# Patient Record
Sex: MALE | Race: WHITE | NOT HISPANIC OR LATINO | Employment: OTHER | URBAN - METROPOLITAN AREA
[De-identification: names, ages, dates, MRNs, and addresses within clinical notes are randomized per-mention and may not be internally consistent; named-entity substitution may affect disease eponyms.]

---

## 2017-01-01 ENCOUNTER — APPOINTMENT (OUTPATIENT)
Dept: RADIOLOGY | Facility: MEDICAL CENTER | Age: 72
DRG: 854 | End: 2017-01-01
Attending: EMERGENCY MEDICINE
Payer: MEDICARE

## 2017-01-01 PROBLEM — A04.72 C. DIFFICILE COLITIS: Status: ACTIVE | Noted: 2017-01-01

## 2017-01-01 PROBLEM — K81.9 CHOLECYSTITIS: Status: ACTIVE | Noted: 2017-01-01

## 2017-01-01 PROBLEM — I10 HTN (HYPERTENSION): Status: ACTIVE | Noted: 2017-01-01

## 2017-01-01 PROBLEM — R79.89 ELEVATED LFTS: Status: ACTIVE | Noted: 2017-01-01

## 2017-01-01 PROCEDURE — 74181 MRI ABDOMEN W/O CONTRAST: CPT

## 2017-01-02 ENCOUNTER — APPOINTMENT (OUTPATIENT)
Dept: RADIOLOGY | Facility: MEDICAL CENTER | Age: 72
DRG: 854 | End: 2017-01-02
Attending: SURGERY
Payer: MEDICARE

## 2017-01-02 PROBLEM — I48.0 PAROXYSMAL A-FIB (HCC): Status: ACTIVE | Noted: 2017-01-02

## 2017-01-02 PROCEDURE — 74300 X-RAY BILE DUCTS/PANCREAS: CPT

## 2017-01-03 ENCOUNTER — APPOINTMENT (OUTPATIENT)
Dept: RADIOLOGY | Facility: MEDICAL CENTER | Age: 72
DRG: 854 | End: 2017-01-03
Attending: INTERNAL MEDICINE
Payer: MEDICARE

## 2017-01-04 ENCOUNTER — PATIENT OUTREACH (OUTPATIENT)
Dept: HEALTH INFORMATION MANAGEMENT | Facility: OTHER | Age: 72
End: 2017-01-04

## 2017-01-04 PROBLEM — A04.72 C. DIFFICILE COLITIS: Status: RESOLVED | Noted: 2017-01-01 | Resolved: 2017-01-04

## 2017-01-04 PROBLEM — K81.9 CHOLECYSTITIS: Status: RESOLVED | Noted: 2017-01-01 | Resolved: 2017-01-04

## 2017-01-05 ENCOUNTER — PATIENT OUTREACH (OUTPATIENT)
Dept: HEALTH INFORMATION MANAGEMENT | Facility: OTHER | Age: 72
End: 2017-01-05

## 2017-01-09 ENCOUNTER — HOSPITAL ENCOUNTER (OUTPATIENT)
Dept: LAB | Facility: MEDICAL CENTER | Age: 72
End: 2017-01-09
Attending: INTERNAL MEDICINE
Payer: MEDICARE

## 2017-01-09 LAB
ALBUMIN SERPL BCP-MCNC: 4 G/DL (ref 3.2–4.9)
ALBUMIN/GLOB SERPL: 1.5 G/DL
ALP SERPL-CCNC: 118 U/L (ref 30–99)
ALT SERPL-CCNC: 114 U/L (ref 2–50)
ANION GAP SERPL CALC-SCNC: 7 MMOL/L (ref 0–11.9)
AST SERPL-CCNC: 43 U/L (ref 12–45)
BILIRUB SERPL-MCNC: 1.5 MG/DL (ref 0.1–1.5)
BUN SERPL-MCNC: 11 MG/DL (ref 8–22)
CALCIUM SERPL-MCNC: 9.2 MG/DL (ref 8.5–10.5)
CHLORIDE SERPL-SCNC: 107 MMOL/L (ref 96–112)
CO2 SERPL-SCNC: 23 MMOL/L (ref 20–33)
CREAT SERPL-MCNC: 0.79 MG/DL (ref 0.5–1.4)
GLOBULIN SER CALC-MCNC: 2.7 G/DL (ref 1.9–3.5)
GLUCOSE SERPL-MCNC: 92 MG/DL (ref 65–99)
POTASSIUM SERPL-SCNC: 3.8 MMOL/L (ref 3.6–5.5)
PROT SERPL-MCNC: 6.7 G/DL (ref 6–8.2)
SODIUM SERPL-SCNC: 137 MMOL/L (ref 135–145)

## 2017-01-09 PROCEDURE — 36415 COLL VENOUS BLD VENIPUNCTURE: CPT

## 2017-01-09 PROCEDURE — 80053 COMPREHEN METABOLIC PANEL: CPT

## 2017-01-18 ENCOUNTER — HOSPITAL ENCOUNTER (OUTPATIENT)
Dept: LAB | Facility: MEDICAL CENTER | Age: 72
End: 2017-01-18
Attending: INTERNAL MEDICINE
Payer: MEDICARE

## 2017-01-18 LAB
ALBUMIN SERPL BCP-MCNC: 4.4 G/DL (ref 3.2–4.9)
ALBUMIN/GLOB SERPL: 1.6 G/DL
ALP SERPL-CCNC: 92 U/L (ref 30–99)
ALT SERPL-CCNC: 61 U/L (ref 2–50)
ANION GAP SERPL CALC-SCNC: 5 MMOL/L (ref 0–11.9)
AST SERPL-CCNC: 36 U/L (ref 12–45)
BILIRUB SERPL-MCNC: 1.6 MG/DL (ref 0.1–1.5)
BUN SERPL-MCNC: 14 MG/DL (ref 8–22)
CALCIUM SERPL-MCNC: 9.7 MG/DL (ref 8.5–10.5)
CHLORIDE SERPL-SCNC: 103 MMOL/L (ref 96–112)
CO2 SERPL-SCNC: 26 MMOL/L (ref 20–33)
CREAT SERPL-MCNC: 0.92 MG/DL (ref 0.5–1.4)
GLOBULIN SER CALC-MCNC: 2.8 G/DL (ref 1.9–3.5)
GLUCOSE SERPL-MCNC: 104 MG/DL (ref 65–99)
POTASSIUM SERPL-SCNC: 4.2 MMOL/L (ref 3.6–5.5)
PROT SERPL-MCNC: 7.2 G/DL (ref 6–8.2)
SODIUM SERPL-SCNC: 134 MMOL/L (ref 135–145)

## 2017-01-18 PROCEDURE — 80053 COMPREHEN METABOLIC PANEL: CPT

## 2017-01-18 PROCEDURE — 36415 COLL VENOUS BLD VENIPUNCTURE: CPT

## 2017-08-02 ENCOUNTER — HOSPITAL ENCOUNTER (OUTPATIENT)
Dept: LAB | Facility: MEDICAL CENTER | Age: 72
End: 2017-08-02
Attending: UROLOGY
Payer: MEDICARE

## 2017-08-02 PROCEDURE — 80053 COMPREHEN METABOLIC PANEL: CPT

## 2017-08-02 PROCEDURE — 84153 ASSAY OF PSA TOTAL: CPT

## 2017-08-02 PROCEDURE — 85025 COMPLETE CBC W/AUTO DIFF WBC: CPT

## 2017-08-03 LAB
ALBUMIN SERPL BCP-MCNC: 4.6 G/DL (ref 3.2–4.9)
ALBUMIN/GLOB SERPL: 1.8 G/DL
ALP SERPL-CCNC: 81 U/L (ref 30–99)
ALT SERPL-CCNC: 26 U/L (ref 2–50)
ANION GAP SERPL CALC-SCNC: 9 MMOL/L (ref 0–11.9)
AST SERPL-CCNC: 20 U/L (ref 12–45)
BASOPHILS # BLD AUTO: 0.6 % (ref 0–1.8)
BASOPHILS # BLD: 0.04 K/UL (ref 0–0.12)
BILIRUB SERPL-MCNC: 1.4 MG/DL (ref 0.1–1.5)
BUN SERPL-MCNC: 18 MG/DL (ref 8–22)
CALCIUM SERPL-MCNC: 10 MG/DL (ref 8.5–10.5)
CHLORIDE SERPL-SCNC: 106 MMOL/L (ref 96–112)
CO2 SERPL-SCNC: 23 MMOL/L (ref 20–33)
CREAT SERPL-MCNC: 0.98 MG/DL (ref 0.5–1.4)
EOSINOPHIL # BLD AUTO: 0 K/UL (ref 0–0.51)
EOSINOPHIL NFR BLD: 0 % (ref 0–6.9)
ERYTHROCYTE [DISTWIDTH] IN BLOOD BY AUTOMATED COUNT: 47.9 FL (ref 35.9–50)
GFR SERPL CREATININE-BSD FRML MDRD: >60 ML/MIN/1.73 M 2
GLOBULIN SER CALC-MCNC: 2.6 G/DL (ref 1.9–3.5)
GLUCOSE SERPL-MCNC: 98 MG/DL (ref 65–99)
HCT VFR BLD AUTO: 51.5 % (ref 42–52)
HGB BLD-MCNC: 17 G/DL (ref 14–18)
IMM GRANULOCYTES # BLD AUTO: 0.03 K/UL (ref 0–0.11)
IMM GRANULOCYTES NFR BLD AUTO: 0.5 % (ref 0–0.9)
LYMPHOCYTES # BLD AUTO: 1.74 K/UL (ref 1–4.8)
LYMPHOCYTES NFR BLD: 27.6 % (ref 22–41)
MCH RBC QN AUTO: 30.5 PG (ref 27–33)
MCHC RBC AUTO-ENTMCNC: 33 G/DL (ref 33.7–35.3)
MCV RBC AUTO: 92.3 FL (ref 81.4–97.8)
MONOCYTES # BLD AUTO: 0.44 K/UL (ref 0–0.85)
MONOCYTES NFR BLD AUTO: 7 % (ref 0–13.4)
NEUTROPHILS # BLD AUTO: 4.05 K/UL (ref 1.82–7.42)
NEUTROPHILS NFR BLD: 64.3 % (ref 44–72)
NRBC # BLD AUTO: 0 K/UL
NRBC BLD AUTO-RTO: 0 /100 WBC
PLATELET # BLD AUTO: 156 K/UL (ref 164–446)
PMV BLD AUTO: 12.3 FL (ref 9–12.9)
POTASSIUM SERPL-SCNC: 4.5 MMOL/L (ref 3.6–5.5)
PROT SERPL-MCNC: 7.2 G/DL (ref 6–8.2)
PSA SERPL-MCNC: 4.06 NG/ML (ref 0–4)
RBC # BLD AUTO: 5.58 M/UL (ref 4.7–6.1)
SODIUM SERPL-SCNC: 138 MMOL/L (ref 135–145)
WBC # BLD AUTO: 6.3 K/UL (ref 4.8–10.8)

## 2017-08-23 ENCOUNTER — OFFICE VISIT (OUTPATIENT)
Dept: MEDICAL GROUP | Facility: PHYSICIAN GROUP | Age: 72
End: 2017-08-23
Payer: MEDICARE

## 2017-08-23 VITALS
DIASTOLIC BLOOD PRESSURE: 82 MMHG | WEIGHT: 276 LBS | HEART RATE: 74 BPM | SYSTOLIC BLOOD PRESSURE: 126 MMHG | TEMPERATURE: 97.9 F | OXYGEN SATURATION: 95 % | HEIGHT: 72 IN | BODY MASS INDEX: 37.38 KG/M2 | RESPIRATION RATE: 16 BRPM

## 2017-08-23 DIAGNOSIS — I10 ESSENTIAL HYPERTENSION: ICD-10-CM

## 2017-08-23 DIAGNOSIS — Z90.5 ACQUIRED SOLITARY KIDNEY: ICD-10-CM

## 2017-08-23 DIAGNOSIS — K21.9 GASTROESOPHAGEAL REFLUX DISEASE WITHOUT ESOPHAGITIS: ICD-10-CM

## 2017-08-23 DIAGNOSIS — Z13.6 SCREENING FOR CARDIOVASCULAR CONDITION: ICD-10-CM

## 2017-08-23 DIAGNOSIS — F40.240 CLAUSTROPHOBIA: ICD-10-CM

## 2017-08-23 DIAGNOSIS — R97.21 RISING PSA FOLLOWING TREATMENT FOR MALIGNANT NEOPLASM OF PROSTATE: ICD-10-CM

## 2017-08-23 DIAGNOSIS — E66.9 OBESITY (BMI 35.0-39.9 WITHOUT COMORBIDITY): ICD-10-CM

## 2017-08-23 PROBLEM — R79.89 ELEVATED LFTS: Status: RESOLVED | Noted: 2017-01-01 | Resolved: 2017-08-23

## 2017-08-23 LAB — EKG 4674: NORMAL

## 2017-08-23 PROCEDURE — 99204 OFFICE O/P NEW MOD 45 MIN: CPT | Performed by: FAMILY MEDICINE

## 2017-08-23 ASSESSMENT — PATIENT HEALTH QUESTIONNAIRE - PHQ9: CLINICAL INTERPRETATION OF PHQ2 SCORE: 0

## 2017-08-23 NOTE — MR AVS SNAPSHOT
"        Nahid Sosa   2017 7:00 AM   Office Visit   MRN: [id number]    Department:  [facility]   Dept Phone:  [phone]    Description:  Male : 1945   Provider:  Emi Gonzalez M.D.           Reason for Visit     Establish Care           Allergies as of 2017     No Known Allergies      You were diagnosed with     Rising PSA following treatment for malignant neoplasm of prostate   [0459833]       Essential hypertension   [7437836]       Gastroesophageal reflux disease without esophagitis   [845707]       Acquired solitary kidney   [911291]       Claustrophobia   [618145]       Obesity (BMI 35.0-39.9 without comorbidity) (HCC)   [286592]       Screening for cardiovascular condition   [002982]         Vital Signs     Blood Pressure Pulse Temperature Respirations Height Weight    126/82 mmHg 74 36.6 °C (97.9 °F) 16 1.835 m (6' 0.24\") 125.193 kg (276 lb)    Body Mass Index Oxygen Saturation Smoking Status             37.18 kg/m2 95% Never Smoker          Basic Information     Date Of Birth Sex Race Ethnicity Preferred Language    1945 Male White Non- English      Your appointments     Sep 22, 2017 12:45 PM   MR RECTAL/PROSTATE PROTOCOL with [address] [facility] 1   [facility] - MRI - [address]    [address]  [place] [id number] [phone]           Some exams require specific prep instructions that would have been given to you at time of scheduling. If you have any additional questions about the prep instructions, please call Imaging Scheduling at [phone] and press #[phone].              Problem List              ICD-10-CM Priority Class Noted - Resolved    HTN (hypertension) I10   2017 - Present    Perioperative Afib  I48.0   2017 - Present    Gastroesophageal reflux disease without esophagitis K21.9   2017 - Present    Rising PSA following treatment for malignant neoplasm of prostate R97.21   2017 - Present    Post-operative Solitary " kidney s/p Left Nephrectomy for Renal Cell Cancer Z90.5   8/23/2017 - Present    Claustrophobia F40.240   8/23/2017 - Present    Obesity (BMI 35.0-39.9 without comorbidity) ([facility]) E66.9   8/23/2017 - Present      Health Maintenance        Date Due Completion Dates    IMM DTaP/Tdap/Td Vaccine (1 - Tdap) 2/23/1965 ---    COLONOSCOPY 2/23/1996 ---    IMM ZOSTER VACCINE 2/23/2006 ---    IMM PNEUMOCOCCAL 65+ (ADULT) LOW/MEDIUM RISK SERIES (1 of 2 - PCV13) 2/23/2011 ---    IMM INFLUENZA (1) 9/1/2017 11/4/2016            Current Immunizations     Influenza TIV (IM) 11/4/2016    Pneumococcal Vaccine (PCV7) Historical Data 11/4/2012      Below and/or attached are the medications your provider expects you to take. Review all of your home medications and newly ordered medications with your provider and/or pharmacist. Follow medication instructions as directed by your provider and/or pharmacist. Please keep your medication list with you and share with your provider. Update the information when medications are discontinued, doses are changed, or new medications (including over-the-counter products) are added; and carry medication information at all times in the event of emergency situations     Allergies:  No Known Allergies          Medications  Valid as of: August 23, 2017 -  7:49 AM    Generic Name Brand Name Tablet Size Instructions for use    Aspirin (Tab)  MG Take 1 Tab by mouth every day.        Lisinopril (Tab) PRINIVIL 10 MG Take 10 mg by mouth every day.        .                 Medicines prescribed today were sent to:     [facility] [id number] [place] [address] [place] [address] [facility] [place] [place] [address]    Phone: [phone] Fax: [phone]    Open 24 Hours?: No      Medication refill instructions:       If your prescription bottle indicates you have medication refills left, it is not necessary to call your provider’s office. Please contact your pharmacy and  they will refill your medication.    If your prescription bottle indicates you do not have any refills left, you may request refills at any time through one of the following ways: The online [facility] system (except Urgent Care), by calling your provider’s office, or by asking your pharmacy to contact your provider’s office with a refill request. Medication refills are processed only during regular business hours and may not be available until the next business day. Your provider may request additional information or to have a follow-up visit with you prior to refilling your medication.   *Please Note: Medication refills are assigned a new Rx number when refilled electronically. Your pharmacy may indicate that no refills were authorized even though a new prescription for the same medication is available at the pharmacy. Please request the medicine by name with the pharmacy before contacting your provider for a refill.        Your To Do List     Future Labs/Procedures Complete By Expires    LIPID PROFILE  As directed 8/23/2018         [facility] Access Code: Activation code not generated  Current [facility] Status: Active

## 2017-09-22 ENCOUNTER — HOSPITAL ENCOUNTER (OUTPATIENT)
Dept: RADIOLOGY | Facility: MEDICAL CENTER | Age: 72
End: 2017-09-22
Attending: UROLOGY
Payer: MEDICARE

## 2017-09-22 VITALS
HEIGHT: 73 IN | TEMPERATURE: 98.6 F | RESPIRATION RATE: 15 BRPM | HEART RATE: 80 BPM | BODY MASS INDEX: 35.12 KG/M2 | SYSTOLIC BLOOD PRESSURE: 101 MMHG | OXYGEN SATURATION: 95 % | WEIGHT: 265 LBS | DIASTOLIC BLOOD PRESSURE: 54 MMHG

## 2017-09-22 DIAGNOSIS — K43.2 INCISIONAL HERNIA WITHOUT MENTION OF OBSTRUCTION OR GANGRENE: ICD-10-CM

## 2017-09-22 PROCEDURE — 72197 MRI PELVIS W/O & W/DYE: CPT

## 2017-09-22 PROCEDURE — 700111 HCHG RX REV CODE 636 W/ 250 OVERRIDE (IP)

## 2017-09-22 PROCEDURE — 700111 HCHG RX REV CODE 636 W/ 250 OVERRIDE (IP): Performed by: RADIOLOGY

## 2017-09-22 PROCEDURE — 700117 HCHG RX CONTRAST REV CODE 255: Performed by: UROLOGY

## 2017-09-22 PROCEDURE — A9577 INJ MULTIHANCE: HCPCS | Performed by: UROLOGY

## 2017-09-22 RX ADMIN — GADOBENATE DIMEGLUMINE 10 ML: 529 INJECTION, SOLUTION INTRAVENOUS at 12:29

## 2017-09-22 RX ADMIN — GLUCAGON HYDROCHLORIDE 1 MG: KIT at 12:35

## 2017-09-22 ASSESSMENT — PAIN SCALES - GENERAL
PAINLEVEL_OUTOF10: 0

## 2017-09-22 NOTE — DISCHARGE INSTRUCTIONS
MRI ADULT DISCHARGE INSTRUCTIONS    You have been medicated today for your scan. Please follow the instructions below to ensure your safe recovery. If you have any questions or problems, feel free to call us at [phone] or [phone].     1.   Have someone stay with you to assist you as needed.    2.   Do not drive or operate any mechanical devices.    3.   Do not perform any activity that requires concentration. Make no major decisions over the next 24 hours.     4.   Be careful changing positions from laying down to sitting or standing, as you may become dizzy.     5.   Do not drink alcohol for 48 hours.    6.   There are no restrictions for eating your normal meals. Drink fluids.    7.   You may continue your usual medications for pain, tranquilizers, muscle relaxants or sedatives when awake.     8.   Tomorrow, you may continue your normal daily activities.     9.   Pressure dressing on 10 - 15 minutes. If swelling or bleeding occurs when removed, continue placing direct pressure on injection site for another 5 minutes, or until bleeding stops.     I have been informed of and understand the above discharge instructions. Midazolam (VERSED)  What is this medicine?  You were given MIDAZOLAM (RICK fletcher) for your procedure today. This medication is a benzodiazepine. It is used to cause relaxation or sleep before surgery and to block the memory of the procedure.  This medicine may be used for other purposes; ask your health care provider or pharmacist if you have questions.  What side effects may I notice from receiving this medicine?  Side effects that you should report to your doctor or health care professional as soon as possible:  • allergic reactions like skin rash, itching or hives, swelling of the face, lips, or tongue  • breathing problems  • confusion  • dizziness or lightheadedness  • fast, irregular heartbeat  • halluninations during recovery  • numbness or tingling in the hands or feet  • pain, redness,  or swelling at site where injected  • seizures  Side effects that usually do not require medical attention (report to your doctor or health care professional if they continue or are bothersome):  • coughing  • headache  • hiccups  • involuntary eye and muscle movements  • loss of memory of events just before, during, and after use  • nausea, vomiting  • speech problems  • tiredness  • trouble sleeping or nightmares  This list may not describe all possible side effects. Call your doctor for medical advice about side effects. You may report side effects to FDA at 6-139-RAL-2114.    Fentanyl  What is this medicine?  You were given FENTANYL (FEN ta nil) for your procedure today, it is a pain reliever. It is used to treat breakthrough pain that your long acting pain medicine does not control. Do not use this medicine for a pain that will go away in a few days like pain from surgery, doctor or dentist visits.   This medicine may be used for other purposes; ask your health care provider or pharmacist if you have questions.  What side effects may I notice from receiving this medicine?  Side effects that you should report to your doctor or health care professional as soon as possible:  • allergic reactions like skin rash, itching or hives, swelling of the face, lips, or tongue  • breathing problems  • changes in vision  • confusion  • dry mouth  • feeling faint, lightheaded  • hallucination  • irregular heartbeat  • mouth pain, sores  • problems with balance, talking, walking  • trouble passing urine or change in the amount of urine  • unusual bleeding or bruising  • unusually weak or tired  Side effects that usually do not require medical attention (report to your doctor or health care professional if they continue or are bothersome):  • dizzy  • headache  • loss of appetite  • nausea, vomiting  • sweating  • tingling in mouth  This list may not describe all possible side effects. Call your doctor for medical advice about  side effects. You may report side effects to FDA at 5-553-FDA-5880.

## 2018-06-11 ENCOUNTER — APPOINTMENT (OUTPATIENT)
Dept: RADIOLOGY | Facility: MEDICAL CENTER | Age: 73
End: 2018-06-11
Attending: EMERGENCY MEDICINE
Payer: MEDICARE

## 2018-06-11 ENCOUNTER — HOSPITAL ENCOUNTER (OUTPATIENT)
Facility: MEDICAL CENTER | Age: 73
End: 2018-06-12
Attending: EMERGENCY MEDICINE | Admitting: HOSPITALIST
Payer: MEDICARE

## 2018-06-11 DIAGNOSIS — R53.83 OTHER FATIGUE: ICD-10-CM

## 2018-06-11 DIAGNOSIS — R06.09 DOE (DYSPNEA ON EXERTION): ICD-10-CM

## 2018-06-11 PROBLEM — I48.0 PAROXYSMAL ATRIAL FIBRILLATION (HCC): Status: ACTIVE | Noted: 2018-06-11

## 2018-06-11 LAB
ALBUMIN SERPL BCP-MCNC: 4.4 G/DL (ref 3.2–4.9)
ALBUMIN/GLOB SERPL: 1.6 G/DL
ALP SERPL-CCNC: 71 U/L (ref 30–99)
ALT SERPL-CCNC: 17 U/L (ref 2–50)
ANION GAP SERPL CALC-SCNC: 9 MMOL/L (ref 0–11.9)
APTT PPP: 34.9 SEC (ref 24.7–36)
AST SERPL-CCNC: 15 U/L (ref 12–45)
BASOPHILS # BLD AUTO: 0.5 % (ref 0–1.8)
BASOPHILS # BLD: 0.03 K/UL (ref 0–0.12)
BILIRUB SERPL-MCNC: 1.1 MG/DL (ref 0.1–1.5)
BNP SERPL-MCNC: 8 PG/ML (ref 0–100)
BUN SERPL-MCNC: 21 MG/DL (ref 8–22)
CALCIUM SERPL-MCNC: 9.3 MG/DL (ref 8.5–10.5)
CHLORIDE SERPL-SCNC: 110 MMOL/L (ref 96–112)
CO2 SERPL-SCNC: 20 MMOL/L (ref 20–33)
CREAT SERPL-MCNC: 0.91 MG/DL (ref 0.5–1.4)
DEPRECATED D DIMER PPP IA-ACNC: <200 NG/ML(D-DU)
EKG IMPRESSION: NORMAL
EKG IMPRESSION: NORMAL
EOSINOPHIL # BLD AUTO: 0.01 K/UL (ref 0–0.51)
EOSINOPHIL NFR BLD: 0.2 % (ref 0–6.9)
ERYTHROCYTE [DISTWIDTH] IN BLOOD BY AUTOMATED COUNT: 46.1 FL (ref 35.9–50)
GLOBULIN SER CALC-MCNC: 2.7 G/DL (ref 1.9–3.5)
GLUCOSE SERPL-MCNC: 104 MG/DL (ref 65–99)
HCT VFR BLD AUTO: 47.8 % (ref 42–52)
HGB BLD-MCNC: 16.2 G/DL (ref 14–18)
IMM GRANULOCYTES # BLD AUTO: 0.03 K/UL (ref 0–0.11)
IMM GRANULOCYTES NFR BLD AUTO: 0.5 % (ref 0–0.9)
INR PPP: 0.96 (ref 0.87–1.13)
LIPASE SERPL-CCNC: 47 U/L (ref 11–82)
LV EJECT FRACT  99904: 70
LV EJECT FRACT MOD 2C 99903: 89.79
LV EJECT FRACT MOD 4C 99902: 79.34
LV EJECT FRACT MOD BP 99901: 85.74
LYMPHOCYTES # BLD AUTO: 1.44 K/UL (ref 1–4.8)
LYMPHOCYTES NFR BLD: 23 % (ref 22–41)
MAGNESIUM SERPL-MCNC: 2.2 MG/DL (ref 1.5–2.5)
MCH RBC QN AUTO: 30.3 PG (ref 27–33)
MCHC RBC AUTO-ENTMCNC: 33.9 G/DL (ref 33.7–35.3)
MCV RBC AUTO: 89.3 FL (ref 81.4–97.8)
MONOCYTES # BLD AUTO: 0.41 K/UL (ref 0–0.85)
MONOCYTES NFR BLD AUTO: 6.5 % (ref 0–13.4)
NEUTROPHILS # BLD AUTO: 4.34 K/UL (ref 1.82–7.42)
NEUTROPHILS NFR BLD: 69.3 % (ref 44–72)
NRBC # BLD AUTO: 0 K/UL
NRBC BLD-RTO: 0 /100 WBC
PHOSPHATE SERPL-MCNC: 2.7 MG/DL (ref 2.5–4.5)
PLATELET # BLD AUTO: 150 K/UL (ref 164–446)
PMV BLD AUTO: 10.6 FL (ref 9–12.9)
POTASSIUM SERPL-SCNC: 4.4 MMOL/L (ref 3.6–5.5)
PROT SERPL-MCNC: 7.1 G/DL (ref 6–8.2)
PROTHROMBIN TIME: 12.5 SEC (ref 12–14.6)
RBC # BLD AUTO: 5.35 M/UL (ref 4.7–6.1)
SODIUM SERPL-SCNC: 139 MMOL/L (ref 135–145)
T4 FREE SERPL-MCNC: 0.87 NG/DL (ref 0.53–1.43)
TROPONIN I SERPL-MCNC: <0.01 NG/ML (ref 0–0.04)
TSH SERPL DL<=0.005 MIU/L-ACNC: 1.25 UIU/ML (ref 0.38–5.33)
WBC # BLD AUTO: 6.3 K/UL (ref 4.8–10.8)

## 2018-06-11 PROCEDURE — 84484 ASSAY OF TROPONIN QUANT: CPT

## 2018-06-11 PROCEDURE — 93005 ELECTROCARDIOGRAM TRACING: CPT

## 2018-06-11 PROCEDURE — 84100 ASSAY OF PHOSPHORUS: CPT

## 2018-06-11 PROCEDURE — 83735 ASSAY OF MAGNESIUM: CPT

## 2018-06-11 PROCEDURE — 93306 TTE W/DOPPLER COMPLETE: CPT

## 2018-06-11 PROCEDURE — 85379 FIBRIN DEGRADATION QUANT: CPT

## 2018-06-11 PROCEDURE — 85610 PROTHROMBIN TIME: CPT

## 2018-06-11 PROCEDURE — 94760 N-INVAS EAR/PLS OXIMETRY 1: CPT

## 2018-06-11 PROCEDURE — 99285 EMERGENCY DEPT VISIT HI MDM: CPT

## 2018-06-11 PROCEDURE — 99220 PR INITIAL OBSERVATION CARE,LEVL III: CPT | Performed by: HOSPITALIST

## 2018-06-11 PROCEDURE — 85730 THROMBOPLASTIN TIME PARTIAL: CPT

## 2018-06-11 PROCEDURE — 93010 ELECTROCARDIOGRAM REPORT: CPT | Performed by: INTERNAL MEDICINE

## 2018-06-11 PROCEDURE — 700102 HCHG RX REV CODE 250 W/ 637 OVERRIDE(OP): Performed by: EMERGENCY MEDICINE

## 2018-06-11 PROCEDURE — 84443 ASSAY THYROID STIM HORMONE: CPT

## 2018-06-11 PROCEDURE — 93005 ELECTROCARDIOGRAM TRACING: CPT | Performed by: HOSPITALIST

## 2018-06-11 PROCEDURE — 700102 HCHG RX REV CODE 250 W/ 637 OVERRIDE(OP): Performed by: HOSPITALIST

## 2018-06-11 PROCEDURE — A9270 NON-COVERED ITEM OR SERVICE: HCPCS | Performed by: EMERGENCY MEDICINE

## 2018-06-11 PROCEDURE — 80053 COMPREHEN METABOLIC PANEL: CPT

## 2018-06-11 PROCEDURE — 85025 COMPLETE CBC W/AUTO DIFF WBC: CPT

## 2018-06-11 PROCEDURE — 83880 ASSAY OF NATRIURETIC PEPTIDE: CPT

## 2018-06-11 PROCEDURE — 84439 ASSAY OF FREE THYROXINE: CPT

## 2018-06-11 PROCEDURE — 83690 ASSAY OF LIPASE: CPT

## 2018-06-11 PROCEDURE — G0378 HOSPITAL OBSERVATION PER HR: HCPCS

## 2018-06-11 PROCEDURE — 71045 X-RAY EXAM CHEST 1 VIEW: CPT

## 2018-06-11 PROCEDURE — A9270 NON-COVERED ITEM OR SERVICE: HCPCS | Performed by: HOSPITALIST

## 2018-06-11 PROCEDURE — 93005 ELECTROCARDIOGRAM TRACING: CPT | Performed by: EMERGENCY MEDICINE

## 2018-06-11 PROCEDURE — 36415 COLL VENOUS BLD VENIPUNCTURE: CPT

## 2018-06-11 PROCEDURE — 93306 TTE W/DOPPLER COMPLETE: CPT | Mod: 26 | Performed by: INTERNAL MEDICINE

## 2018-06-11 RX ORDER — BISACODYL 10 MG
10 SUPPOSITORY, RECTAL RECTAL
Status: DISCONTINUED | OUTPATIENT
Start: 2018-06-11 | End: 2018-06-12 | Stop reason: HOSPADM

## 2018-06-11 RX ORDER — AMOXICILLIN 250 MG
2 CAPSULE ORAL 2 TIMES DAILY
Status: DISCONTINUED | OUTPATIENT
Start: 2018-06-11 | End: 2018-06-12 | Stop reason: HOSPADM

## 2018-06-11 RX ORDER — LISINOPRIL 10 MG/1
10 TABLET ORAL DAILY
Status: DISCONTINUED | OUTPATIENT
Start: 2018-06-11 | End: 2018-06-12 | Stop reason: HOSPADM

## 2018-06-11 RX ORDER — ACETAMINOPHEN 325 MG/1
650 TABLET ORAL EVERY 6 HOURS PRN
Status: DISCONTINUED | OUTPATIENT
Start: 2018-06-11 | End: 2018-06-12 | Stop reason: HOSPADM

## 2018-06-11 RX ORDER — ASPIRIN 325 MG
325 TABLET ORAL ONCE
Status: COMPLETED | OUTPATIENT
Start: 2018-06-11 | End: 2018-06-11

## 2018-06-11 RX ORDER — POLYETHYLENE GLYCOL 3350 17 G/17G
1 POWDER, FOR SOLUTION ORAL
Status: DISCONTINUED | OUTPATIENT
Start: 2018-06-11 | End: 2018-06-12 | Stop reason: HOSPADM

## 2018-06-11 RX ADMIN — LISINOPRIL 10 MG: 10 TABLET ORAL at 21:33

## 2018-06-11 RX ADMIN — ASPIRIN 325 MG: 325 TABLET ORAL at 11:06

## 2018-06-11 RX ADMIN — DOCUSATE SODIUM AND SENNOSIDES 2 TABLET: 8.6; 5 TABLET, FILM COATED ORAL at 21:37

## 2018-06-11 ASSESSMENT — LIFESTYLE VARIABLES
ALCOHOL_USE: NO
EVER_SMOKED: NEVER

## 2018-06-11 ASSESSMENT — PAIN SCALES - GENERAL
PAINLEVEL_OUTOF10: 0

## 2018-06-11 ASSESSMENT — ENCOUNTER SYMPTOMS
ORTHOPNEA: 0
PALPITATIONS: 1
SHORTNESS OF BREATH: 1
FEVER: 0
PND: 0

## 2018-06-11 ASSESSMENT — PATIENT HEALTH QUESTIONNAIRE - PHQ9
1. LITTLE INTEREST OR PLEASURE IN DOING THINGS: NOT AT ALL
SUM OF ALL RESPONSES TO PHQ9 QUESTIONS 1 AND 2: 0
SUM OF ALL RESPONSES TO PHQ9 QUESTIONS 1 AND 2: 0
2. FEELING DOWN, DEPRESSED, IRRITABLE, OR HOPELESS: NOT AT ALL
2. FEELING DOWN, DEPRESSED, IRRITABLE, OR HOPELESS: NOT AT ALL
1. LITTLE INTEREST OR PLEASURE IN DOING THINGS: NOT AT ALL

## 2018-06-11 NOTE — ED PROVIDER NOTES
ED Provider Note    Scribed for Mer Csat M.D. by Kanchan Victoria. 6/11/2018  9:34 AM    Primary care provider: Emi Gonzalez M.D.  Means of arrival: Walk in  History obtained from: Patient  History limited by: None    CHIEF COMPLAINT  •  Dizziness  •  Shortness of Breath  •  Palpitations    HPI  Nahid Sosa is a 73 y.o. male who presents to the Emergency Department for dizziness, shortness of breath and palpitations with an onset of 2 days. History of new onset of atrial fibrillation one year ago. Patient reports a history of hypertension. Patient has experienced intermittent dizziness for the past two months. He returned to [place] from [place] four weeks ago and states he has been under significant stress. His symptoms worsened two days ago while vacuuming and began experiencing dizziness, shortness of breath and palpitations.  He is short of breath primarily with activity which resolves at rest. He complains of palpitations described as pounding in his chest. He has experienced similar episodes over the past two days. He did not take Aspirin today. Negative for fevers, chills, chest pain, leg swelling, cough, abdominal pain, nausea, vomiting, diarrhea, headache, loss of consciousness, focal weakness or numbness. He reports taking Lisinopril over the past few weeks but has not taken the medication for the past nine months. No prior cardiac history. Positive family history of cardiac disease.      REVIEW OF SYSTEMS  HEENT:  No ear pain, congestion, or sore throat   EYES: No vision changes  CARDIAC: Positive for palpitations. No chest pain or leg swelling.  PULMONARY: Positive for shortness of breath. No cough.  GI: No abdominal pain, nausea, vomiting or diarrhea.  : no dysuria, back pain, or hematuria   Neuro: Positive for dizziness. No headache, loss of consciousness, focal weakness or numbness.  Musculoskeletal: No leg swelling.  Endocrine: No fevers or chills.  SKIN: No rash.  Psychiatric:  Positive increased stress.    See history of present illness. All other systems are negative. C.      PAST MEDICAL HISTORY  Patient has a past medical history of Atrial fibrillation one year ago; Diverticulosis; Hypertension; Incisional hernia; Prostate cancer (CMS-HCC); and Renal cell cancer (CMS-HCC). No prior cardiac history.       SURGICAL HISTORY  Patient has a past surgical history that includes samir by laparoscopy (N/A, 2017); ercp in or (1/3/2017); and other.      SOCIAL HISTORY  Social History   Substance Use Topics   • Smoking status: Never Smoker   • Smokeless tobacco: Never Used   • Alcohol use 0.0 oz/week      Comment: occasional       History   Drug Use No   Patient is accompanied by his spouse.      FAMILY HISTORY  Family History   Problem Relation Age of Onset   • Heart Disease Father      possibly heart disease   • Other Mother       of Polio early age   Positive family history of cardiac disease.      CURRENT MEDICATIONS  Home Medications     Reviewed by Chaya Solis (Pharmacy Tech) on 18 at 1110  Med List Status: Complete   Medication Last Dose Status   aspirin EC (ECOTRIN) 81 MG Tablet Delayed Response 6/10/2018 Active   lisinopril (PRINIVIL) 10 MG Tab 2018 Active   multivitamin (THERAGRAN) Tab 2018 Active            Patient denies taking Aspirin today.      ALLERGIES  None      PHYSICAL EXAM  VITAL SIGNS: /86   Pulse 69   Temp 37 °C (98.6 °F)   Resp 16   Wt 122.6 kg (270 lb 4.5 oz)   SpO2 94%   BMI 35.66 kg/m²       Constitutional: Well developed, Well nourished, No acute distress, Non-toxic appearance.   HEENT: Normocephalic, Atraumatic,  external ears normal, pharynx pink,  Mucous  Membranes moist, No rhinorrhea or mucosal edema  Eyes: PERRL, EOMI, Conjunctiva normal, No discharge.   Neck: Normal range of motion, No tenderness, Supple, No stridor.   Lymphatic: No lymphadenopathy    Cardiovascular: Regular Rate and Rhythm, No murmurs,  rubs, or  gallops.   Thorax & Lungs: Lungs clear to auscultation bilaterally, No respiratory distress, No wheezes, rhales or rhonchi, No chest wall tenderness.   Abdomen: Bowel sounds normal, Soft, non tender, non distended,  No pulsatile masses., no rebound guarding or peritoneal signs.   Skin: Warm, Dry, No erythema, No rash,   Back:  No CVA tenderness,  No spinal tenderness, bony crepitance, step offs, or instability.   Neurologic: Alert & oriented x 3, Normal motor function, Normal sensory function, No focal deficits noted. Normal reflexes. Normal Cranial Nerves.  Extremities: Equal, intact distal pulses, No cyanosis, clubbing or edema,  No tenderness.  No venous cording.  Musculoskeletal: Good range of motion in all major joints. No tenderness to palpation or major deformities noted.       DIAGNOSTIC STUDIES / PROCEDURES    LABS  Results for orders placed or performed during the hospital encounter of 06/11/18   CBC with Differential   Result Value Ref Range    WBC 6.3 4.8 - 10.8 K/uL    RBC 5.35 4.70 - 6.10 M/uL    Hemoglobin 16.2 14.0 - 18.0 g/dL    Hematocrit 47.8 42.0 - 52.0 %    MCV 89.3 81.4 - 97.8 fL    MCH 30.3 27.0 - 33.0 pg    MCHC 33.9 33.7 - 35.3 g/dL    RDW 46.1 35.9 - 50.0 fL    Platelet Count 150 (L) 164 - 446 K/uL    MPV 10.6 9.0 - 12.9 fL    Neutrophils-Polys 69.30 44.00 - 72.00 %    Lymphocytes 23.00 22.00 - 41.00 %    Monocytes 6.50 0.00 - 13.40 %    Eosinophils 0.20 0.00 - 6.90 %    Basophils 0.50 0.00 - 1.80 %    Immature Granulocytes 0.50 0.00 - 0.90 %    Nucleated RBC 0.00 /100 WBC    Neutrophils (Absolute) 4.34 1.82 - 7.42 K/uL    Lymphs (Absolute) 1.44 1.00 - 4.80 K/uL    Monos (Absolute) 0.41 0.00 - 0.85 K/uL    Eos (Absolute) 0.01 0.00 - 0.51 K/uL    Baso (Absolute) 0.03 0.00 - 0.12 K/uL    Immature Granulocytes (abs) 0.03 0.00 - 0.11 K/uL    NRBC (Absolute) 0.00 K/uL   Complete Metabolic Panel (CMP)   Result Value Ref Range    Sodium 139 135 - 145 mmol/L    Potassium 4.4 3.6 - 5.5 mmol/L     Chloride 110 96 - 112 mmol/L    Co2 20 20 - 33 mmol/L    Anion Gap 9.0 0.0 - 11.9    Glucose 104 (H) 65 - 99 mg/dL    Bun 21 8 - 22 mg/dL    Creatinine 0.91 0.50 - 1.40 mg/dL    Calcium 9.3 8.5 - 10.5 mg/dL    AST(SGOT) 15 12 - 45 U/L    ALT(SGPT) 17 2 - 50 U/L    Alkaline Phosphatase 71 30 - 99 U/L    Total Bilirubin 1.1 0.1 - 1.5 mg/dL    Albumin 4.4 3.2 - 4.9 g/dL    Total Protein 7.1 6.0 - 8.2 g/dL    Globulin 2.7 1.9 - 3.5 g/dL    A-G Ratio 1.6 g/dL   Btype Natriuretic Peptide (BNP)   Result Value Ref Range    B Natriuretic Peptide 8 0 - 100 pg/mL   Prothrombin Time (PT/INR)   Result Value Ref Range    PT 12.5 12.0 - 14.6 sec    INR 0.96 0.87 - 1.13   APTT   Result Value Ref Range    APTT 34.9 24.7 - 36.0 sec   Lipase   Result Value Ref Range    Lipase 47 11 - 82 U/L   Troponin STAT   Result Value Ref Range    Troponin I <0.01 0.00 - 0.04 ng/mL   Magnesium   Result Value Ref Range    Magnesium 2.2 1.5 - 2.5 mg/dL   Phosphorus   Result Value Ref Range    Phosphorus 2.7 2.5 - 4.5 mg/dL   D-Dimer   Result Value Ref Range    D-Dimer Screen <200 <250 ng/mL(D-DU)   TSH (for screening thyroid dysfunction)   Result Value Ref Range    TSH 1.250 0.380 - 5.330 uIU/mL   Free Thyroxine (add to TSH for patients with existing thyroid dysfunction)   Result Value Ref Range    Free T-4 0.87 0.53 - 1.43 ng/dL   ESTIMATED GFR   Result Value Ref Range    GFR If African American >60 >60 mL/min/1.73 m 2    GFR If Non African American >60 >60 mL/min/1.73 m 2   EKG (ER)   Result Value Ref Range    Report       [facility] Emergency Dept.    Test Date:  2018  Pt Name:    TEENA AUSTIN             Department: ER  MRN:        [id number]                      Room:  Gender:     Male                         Technician: [id number]  :        1945                   Requested By:ER TRIAGE PROTOCOL  Order #:    [id number]                    Allie GARCIA:    Measurements  Intervals                                 Axis  Rate:       65                           P:          -11  LA:         188                          QRS:        95  QRSD:       84                           T:          61  QT:         404  QTc:        421    Interpretive Statements  SINUS RHYTHM  RIGHT AXIS DEVIATION  BORDERLINE LOW VOLTAGE IN FRONTAL LEADS  CONSIDER ANTEROSEPTAL INFARCT  Compared to ECG 01/01/2017 18:54:09  Right-axis deviation now present  Atrial fibrillation no longer present  Myocardial infarct finding still present        All labs reviewed by me.      EKG  12 lead EKG; Interpreted by emergency department physician    Rhythm: Normal Sinus Rhythm   Rate: 65  Axis: Right axis deviation  R Wave: Normal R wave progression  Normal ST-T segments  ST Segments: No acute ST changes  T waves: Normal  Q waves: None    Clinical Impression: No acute changes; improved from prior EKG which indicated atrial fibrillation with RVR      RADIOLOGY  DX-CHEST-PORTABLE (1 VIEW)   Final Result      No acute cardiac or pulmonary abnormalities are identified.      NM-CARDIAC STRESS TEST    (Results Pending)   ECHOCARDIOGRAM COMP W/O CONT    (Results Pending)     The radiologist's interpretation of all radiological studies have been reviewed by me.      COURSE & MEDICAL DECISION MAKING  Pertinent Labs & Imaging studies reviewed. (See chart for details)    Differential Diagnoses include but are not limited to: acute coronary syndrome, cardiac ischemia, MI, CHF, less likely COPD.    9:34 Obtained and reviewed patient's electronic medical record which indicates a cholecystectomy in 01/2017.    9:38 AM Patient seen and examined at bedside. Patient presents for dizziness, palpitations and shortness of breath.  Exam indicates a regular rate and rhythm.      Initial orders in the Emergency Department included EKG, XR chest and laboratory testing: D dimer, TSH, free thyroxine, CBC with differential, CMP, estimated GFR, BNP, prothrombin time, APTT, lipase, troponin,  magnesium and phosphorus.      10:40 AM Ordered 325 mg of Aspirin PO.    11:01 AM Spoke with Dr. Jacome, Hospitalist, regarding the patient's presenting symptoms and prior history of atrial fibrillation. He will consult and admit the patient for further evaluation and care.    11:04 AM On repeat evaluation, lab and XR results were reviewed with the patient. Plan for admission with Dr. Jacome. Patient verbalized his agreement to this plan.          DISPOSITION  Patient will be admitted to Dr. Jacome, Hospitalist, in stable condition.      DIAGNOSIS  1. ROBB (dyspnea on exertion)    2. Other fatigue           The note accurately reflects work and decisions made by me.  Mer Cast  6/11/2018  1:49 PM     Kanchan COOPER (Scribe), am scribing for, and in the presence of, Mer Cast M.D.    Electronically signed by: Kanchan Victoria (Scribangélica), 6/11/2018    Mer COOPER M.D. personally performed the services described in this documentation, as scribed by Kanchan Victoria in my presence, and it is both accurate and complete.

## 2018-06-11 NOTE — ED TRIAGE NOTES
Pt comes complaining of SOB, dizziness, palpitations for several months but worse over the weekend. Family stating pt is unable to walk any distance without extreme dizziness and palpitations reporting hx of afib approx 1 year ago. EKG called for.

## 2018-06-11 NOTE — PROGRESS NOTES
Report received, assumed patient care.  Pt A&OX4.  Family at bedside.  Assessment completed.  Call light within reach, personal belongings available, bed in lowest position, pt calling for assistance.  Pt reports no chest pain.  Communication board updated, POC discussed.  Monitors applied, VSS.  No additional needs at this time.

## 2018-06-12 ENCOUNTER — PATIENT OUTREACH (OUTPATIENT)
Dept: HEALTH INFORMATION MANAGEMENT | Facility: OTHER | Age: 73
End: 2018-06-12

## 2018-06-12 ENCOUNTER — APPOINTMENT (OUTPATIENT)
Dept: RADIOLOGY | Facility: MEDICAL CENTER | Age: 73
End: 2018-06-12
Attending: HOSPITALIST
Payer: MEDICARE

## 2018-06-12 VITALS
HEART RATE: 67 BPM | TEMPERATURE: 98 F | WEIGHT: 270.28 LBS | DIASTOLIC BLOOD PRESSURE: 67 MMHG | SYSTOLIC BLOOD PRESSURE: 148 MMHG | OXYGEN SATURATION: 93 % | RESPIRATION RATE: 20 BRPM | BODY MASS INDEX: 36.61 KG/M2 | HEIGHT: 72 IN

## 2018-06-12 LAB — EKG IMPRESSION: NORMAL

## 2018-06-12 PROCEDURE — 700111 HCHG RX REV CODE 636 W/ 250 OVERRIDE (IP)

## 2018-06-12 PROCEDURE — A9502 TC99M TETROFOSMIN: HCPCS

## 2018-06-12 PROCEDURE — 99217 PR OBSERVATION CARE DISCHARGE: CPT | Performed by: HOSPITALIST

## 2018-06-12 PROCEDURE — G0378 HOSPITAL OBSERVATION PER HR: HCPCS

## 2018-06-12 PROCEDURE — A9270 NON-COVERED ITEM OR SERVICE: HCPCS | Performed by: NURSE PRACTITIONER

## 2018-06-12 PROCEDURE — 700102 HCHG RX REV CODE 250 W/ 637 OVERRIDE(OP): Performed by: NURSE PRACTITIONER

## 2018-06-12 RX ORDER — CALCIUM CARBONATE 500 MG/1
500 TABLET, CHEWABLE ORAL 3 TIMES DAILY PRN
Status: DISCONTINUED | OUTPATIENT
Start: 2018-06-12 | End: 2018-06-12 | Stop reason: HOSPADM

## 2018-06-12 RX ORDER — REGADENOSON 0.08 MG/ML
INJECTION, SOLUTION INTRAVENOUS
Status: COMPLETED
Start: 2018-06-12 | End: 2018-06-12

## 2018-06-12 RX ORDER — ALPRAZOLAM 0.5 MG/1
0.5 TABLET ORAL ONCE
Status: COMPLETED | OUTPATIENT
Start: 2018-06-12 | End: 2018-06-12

## 2018-06-12 RX ADMIN — ALPRAZOLAM 0.5 MG: 0.5 TABLET ORAL at 08:15

## 2018-06-12 RX ADMIN — REGADENOSON 0.4 MG: 0.08 INJECTION, SOLUTION INTRAVENOUS at 08:59

## 2018-06-12 ASSESSMENT — PAIN SCALES - GENERAL: PAINLEVEL_OUTOF10: 0

## 2018-06-12 NOTE — ASSESSMENT & PLAN NOTE
History of.  If he does not have any arrhythmias on tele, he may be a good candidate for a holter monitor outpatient give his symptoms and hx of PAF.

## 2018-06-12 NOTE — DISCHARGE INSTRUCTIONS
Discharge Instructions    Discharged to home by car with relative. Discharged via wheelchair, hospital escort: Yes.  Special equipment needed: Not Applicable    Be sure to schedule a follow-up appointment with your primary care doctor or any specialists as instructed.     Discharge Plan:   Diet Plan: Discussed (Regular)  Activity Level: Discussed (As tolerated)  Confirmed Follow up Appointment: Patient to Call and Schedule Appointment  Confirmed Symptoms Management: Discussed  Medication Reconciliation Updated: Yes  Influenza Vaccine Indication: Not indicated: Previously immunized this influenza season and > 8 years of age    I understand that a diet low in cholesterol, fat, and sodium is recommended for good health. Unless I have been given specific instructions below for another diet, I accept this instruction as my diet prescription.   Other diet: Regular    Special Instructions: None    · Is patient discharged on Warfarin / Coumadin?   No     Depression / Suicide Risk    As you are discharged from this [facility] facility, it is important to learn how to keep safe from harming yourself.    Recognize the warning signs:  · Abrupt changes in personality, positive or negative- including increase in energy   · Giving away possessions  · Change in eating patterns- significant weight changes-  positive or negative  · Change in sleeping patterns- unable to sleep or sleeping all the time   · Unwillingness or inability to communicate  · Depression  · Unusual sadness, discouragement and loneliness  · Talk of wanting to die  · Neglect of personal appearance   · Rebelliousness- reckless behavior  · Withdrawal from people/activities they love  · Confusion- inability to concentrate     If you or a loved one observes any of these behaviors or has concerns about self-harm, here's what you can do:  · Talk about it- your feelings and reasons for harming yourself  · Remove any means that you might use to hurt yourself (examples:  pills, rope, extension cords, firearm)  · Get professional help from the community (Mental Health, Substance Abuse, psychological counseling)  · Do not be alone:Call your Safe Contact- someone whom you trust who will be there for you.  · Call your local CRISIS HOTLINE 470-9236 or 214-253-9552  · Call your local Children's Mobile Crisis Response Team [place] (707) 948-0959 or www.[employer]  · Call the toll free National Suicide Prevention Hotlines   · National Suicide Prevention Lifeline 346-671-DVAN (2467)  · National Hope Line Network 800-SUICIDE (585-6063)

## 2018-06-12 NOTE — PROGRESS NOTES
Report received, assumed patient care.  Pt A&OX4.  Assessment completed.  Call light within reach, personal belongings available, bed in lowest position, pt calling for assistance.  Pt reports no chest pain.  - dizziness.  Communication board updated, POC discussed.  Monitors reapplied, VSS.  No additional needs at this time.

## 2018-06-12 NOTE — H&P
Hospital Medicine History and Physical    Date of Service  6/11/2018    Chief Complaint  Chief Complaint   Patient presents with   • Palpitations   • Dizziness       History of Presenting Illness  73 y.o. male who presented 6/11/2018 with shortness of breath. Mr. Sosa is a history of ectomy and postoperative atrial fibrillation that for the past month has been expressing progressive shortness of breath with exertion associated dizziness as well.  He states this week while vacuuming he had to stop 2 times and this would be extremely unusual for him to feel so short of breath which with relatively light duty exertion.  Last night he experienced palpitations with shortness of breath even while at rest.  Symptoms of with palpitations shortness of breath again this morning.  Now to the point where he cannot even walk up his driveway without becoming short of breath.  He presented emergency room with these complaints and here he is in a sinus rhythm labs are unrevealing.  He denies chest pain.  Patient will be admitted for further workup on the telemetry floor including echocardiogram and stress test.  Discussed that he may be a good candidate for an outpatient cardiac monitor.  Primary Care Physician  Emi Gonzalez M.D.    Consultants      Code Status  full    Review of Systems  Review of Systems   Constitutional: Negative for fever.        No weight loss nor weight gain   Respiratory: Positive for shortness of breath.    Cardiovascular: Positive for palpitations. Negative for chest pain, orthopnea, leg swelling and PND.   All other systems reviewed and are negative.       Past Medical History  Past Medical History:   Diagnosis Date   • Diverticulosis    • Hypertension    • Incisional hernia    • Prostate cancer (HCC)     s/p MRI guided laser ablation in [place]   • Renal cell cancer (HCC)     left, nephrectomy    post-surgical atrial fibrillation     Surgical History  Past Surgical History:   Procedure Laterality Date    • ERCP IN OR  1/3/2017    Procedure: ERCP IN OR;  Surgeon: Trevon Valle M.D.;  Location: SURGERY [facility];  Service:    • KATHARINA BY LAPAROSCOPY N/A 2017    Procedure: KATHARINA BY LAPAROSCOPY- Grams;  Surgeon: Twyla Jonas M.D.;  Location: SURGERY [facility];  Service:    • OTHER      MRI guided laser ablation of prostate       Medications  No current facility-administered medications on file prior to encounter.      Current Outpatient Prescriptions on File Prior to Encounter   Medication Sig Dispense Refill   • lisinopril (PRINIVIL) 10 MG Tab Take 10 mg by mouth every day.         Family History  Family History   Problem Relation Age of Onset   • Heart Disease Father      possibly heart disease   • Other Mother       of Polio early age   father  of a heart attack in his 80's.    Social History  Social History   Substance Use Topics   • Smoking status: Never Smoker   • Smokeless tobacco: Never Used   • Alcohol use 0.0 oz/week      Comment: occasional    he has increased stress from moving    Allergies  No Known Allergies     Physical Exam  Laboratory   Hemodynamics  Temp (24hrs), Av.8 °C (98.3 °F), Min:36.8 °C (98.2 °F), Max:37 °C (98.6 °F)   Temperature: 36.8 °C (98.2 °F)  Pulse  Av  Min: 60  Max: 75 Heart Rate (Monitored): 64  Blood Pressure : 136/70, NIBP: 126/50      Respiratory      Respiration: 16, Pulse Oximetry: 95 %             Physical Exam   Constitutional: He is oriented to person, place, and time. No distress.   HENT:   Head: Normocephalic and atraumatic.   Neck: Neck supple.   Cardiovascular: Normal rate and regular rhythm.    No murmur heard.  Pulmonary/Chest: Effort normal. No respiratory distress. He has no wheezes. He has no rales.   Abdominal: Soft. Bowel sounds are normal. He exhibits no distension. There is no tenderness.   Musculoskeletal: He exhibits no edema or tenderness.   Neurological: He is alert and oriented to person, place, and time.   Skin: Skin is  warm. He is not diaphoretic.   Psychiatric: He has a normal mood and affect. His behavior is normal.   Nursing note and vitals reviewed.      Recent Labs      06/11/18   0955   WBC  6.3   RBC  5.35   HEMOGLOBIN  16.2   HEMATOCRIT  47.8   MCV  89.3   MCH  30.3   MCHC  33.9   RDW  46.1   PLATELETCT  150*   MPV  10.6     Recent Labs      06/11/18   0955   SODIUM  139   POTASSIUM  4.4   CHLORIDE  110   CO2  20   GLUCOSE  104*   BUN  21   CREATININE  0.91   CALCIUM  9.3     Recent Labs      06/11/18   0955   ALTSGPT  17   ASTSGOT  15   ALKPHOSPHAT  71   TBILIRUBIN  1.1   LIPASE  47   GLUCOSE  104*     Recent Labs      06/11/18   0955   APTT  34.9   INR  0.96     Recent Labs      06/11/18   0955   BNPBTYPENAT  8         Lab Results   Component Value Date    TROPONINI <0.01 06/11/2018     Urinalysis:    Lab Results  Component Value Date/Time   SPECGRAVITY 1.017 12/31/2016 1842   GLUCOSEUR Negative 12/31/2016 1842   KETONES Negative 12/31/2016 1842   NITRITE Negative 12/31/2016 1842        Imaging  ECHOCARDIOGRAM COMP W/O CONT   Final Result      DX-CHEST-PORTABLE (1 VIEW)   Final Result      No acute cardiac or pulmonary abnormalities are identified.      NM-CARDIAC STRESS TEST    (Results Pending)     EKG: sinus rhythm at 65, no dynamic changes.    Assessment/Plan     I anticipate this patient is appropriate for observation status at this time.    * ROBB (dyspnea on exertion)- (present on admission)   Assessment & Plan    This has been rapidly progressive over the past month.   His previous echo had revealed an EF of 70% on 1/17 and will be repeated.  This may be due to tachyarrhythmia thus monitor on tele.  This may be an anginal equivalent thus serial troponins and a stress test has been ordered for the morning.         Paroxysmal atrial fibrillation (HCC)- (present on admission)   Assessment & Plan    History of.  If he does not have any arrhythmias on tele, he may be a good candidate for a holter monitor outpatient  give his symptoms and hx of PAF.        Obesity (BMI 35.0-39.9 without comorbidity)- (present on admission)   Assessment & Plan    BMI is 36.6        HTN (hypertension)- (present on admission)   Assessment & Plan    On lisinopril 10 mg daily            VTE prophylaxis: lovenox.

## 2018-06-12 NOTE — PROGRESS NOTES
Pt discharged to home. IV d/c'd, pt armband removed. Pt and family understand written and verbal d/c instructions.  No new rx's.  Regular diet, activity as tolerated.  Pt to follow up with PCP as scheduled.  Pt verbalized understanding.

## 2018-06-12 NOTE — ASSESSMENT & PLAN NOTE
This has been rapidly progressive over the past month.   His previous echo had revealed an EF of 70% on 1/17 and will be repeated.  This may be due to tachyarrhythmia thus monitor on tele.  This may be an anginal equivalent thus serial troponins and a stress test has been ordered for the morning.

## 2018-06-12 NOTE — DISCHARGE SUMMARY
Discharge Summary    CHIEF COMPLAINT ON ADMISSION  Chief Complaint   Patient presents with   • Palpitations   • Dizziness       Reason for Admission  Dizziness   Palpitations    Admission Date  6/11/2018    CODE STATUS  Full Code    HPI & HOSPITAL COURSE  This is a 73 y.o. male with a past medical history ectomy and postoperative atrial fibrillation here with complaints of intermittent palpitations, dyspnea on exertion, and intermittent dizziness. The patient reports that he has recently returned to [place] from a lower altitude and that his symptoms of dizziness, dyspnea on exertion, and palpitations have started since he has returned to a higher altitude. He does report having a history of atrial fibrillation after surgery but this resolved and the patient takes no medication for this. On admission his EKG was stable in regular sinus rhythm. He maintained sinus rhythm on telemetry over his hospital stay. His echocardiogram revealed an LVEF of 70% with no other acute abnormality. Cardiac stress test was negative for inducible ischemia. The patient has not had any episodes of dizziness or palpitations since admission. There is no indication at this time for acute intervention. He is recommended to have outpatient Holter monitoring to assess for underlying dysrhythmia as there was no indication of this while in the inpatient setting. At this time as the patient is stable he will be discharged home.  He is discharged in good and stable condition to home with close outpatient follow-up.    Discharge Date  06/12/2018    FOLLOW UP ITEMS POST DISCHARGE  1. Holter monitoring to assess for dysrhythmia.    DISCHARGE DIAGNOSES  Principal Problem:    ROBB (dyspnea on exertion) POA: Yes  Active Problems:    Paroxysmal atrial fibrillation (HCC) POA: Yes    HTN (hypertension) POA: Yes    Obesity (BMI 35.0-39.9 without comorbidity) POA: Yes  Resolved Problems:    * No resolved hospital problems. *      FOLLOW UP  Future  Appointments  Date Time Provider Department Center   6/19/2018 8:00 AM RYAN Joyner   7/11/2018 7:00 AM [facility]       MEDICATIONS ON DISCHARGE     Medication List      CONTINUE taking these medications      Instructions   aspirin EC 81 MG Tbec  Commonly known as:  ECOTRIN   Take 81 mg by mouth every 48 hours.  Dose:  81 mg     lisinopril 10 MG Tabs  Commonly known as:  PRINIVIL   Take 10 mg by mouth every day.  Dose:  10 mg     multivitamin Tabs   Take 1 Tab by mouth every day.  Dose:  1 Tab            Allergies  No Known Allergies    DIET  Orders Placed This Encounter   Procedures   • DIET ORDER     Standing Status:   Standing     Number of Occurrences:   1     Order Specific Question:   Diet:     Answer:   Regular [1]     Order Specific Question:   Miscellaneous modifications:     Answer:   No Decaf, No Caffeine(for test) [11]     Comments:   Patient to have no caffeine for 12 hours prior to exam (decaf, coffee, cola, tea, chocolate)       ACTIVITY  As tolerated.      LABORATORY  Lab Results   Component Value Date    SODIUM 139 06/11/2018    POTASSIUM 4.4 06/11/2018    CHLORIDE 110 06/11/2018    CO2 20 06/11/2018    GLUCOSE 104 (H) 06/11/2018    BUN 21 06/11/2018    CREATININE 0.91 06/11/2018        Lab Results   Component Value Date    WBC 6.3 06/11/2018    HEMOGLOBIN 16.2 06/11/2018    HEMATOCRIT 47.8 06/11/2018    PLATELETCT 150 (L) 06/11/2018        Total time of the discharge process was 32 minutes.

## 2018-06-14 ENCOUNTER — PATIENT OUTREACH (OUTPATIENT)
Dept: HEALTH INFORMATION MANAGEMENT | Facility: OTHER | Age: 73
End: 2018-06-14

## 2018-06-19 ENCOUNTER — OFFICE VISIT (OUTPATIENT)
Dept: MEDICAL GROUP | Facility: PHYSICIAN GROUP | Age: 73
End: 2018-06-19
Payer: MEDICARE

## 2018-06-19 VITALS
HEART RATE: 82 BPM | WEIGHT: 269 LBS | BODY MASS INDEX: 36.44 KG/M2 | TEMPERATURE: 96.8 F | RESPIRATION RATE: 16 BRPM | DIASTOLIC BLOOD PRESSURE: 80 MMHG | OXYGEN SATURATION: 93 % | HEIGHT: 72 IN | SYSTOLIC BLOOD PRESSURE: 126 MMHG

## 2018-06-19 DIAGNOSIS — R00.2 PALPITATIONS: ICD-10-CM

## 2018-06-19 DIAGNOSIS — H92.03 OTALGIA OF BOTH EARS: ICD-10-CM

## 2018-06-19 DIAGNOSIS — F41.9 ANXIETY: ICD-10-CM

## 2018-06-19 DIAGNOSIS — R06.09 DOE (DYSPNEA ON EXERTION): ICD-10-CM

## 2018-06-19 DIAGNOSIS — I48.0 PAROXYSMAL ATRIAL FIBRILLATION (HCC): ICD-10-CM

## 2018-06-19 PROCEDURE — 99214 OFFICE O/P EST MOD 30 MIN: CPT | Performed by: FAMILY MEDICINE

## 2018-06-19 RX ORDER — ALPRAZOLAM 0.5 MG/1
0.5 TABLET ORAL 2 TIMES DAILY PRN
Qty: 15 TAB | Refills: 0 | Status: SHIPPED | OUTPATIENT
Start: 2018-06-19 | End: 2018-07-19

## 2018-06-19 RX ORDER — BUSPIRONE HYDROCHLORIDE 10 MG/1
10 TABLET ORAL 2 TIMES DAILY
Qty: 60 TAB | Refills: 11 | Status: SHIPPED | OUTPATIENT
Start: 2018-06-19 | End: 2018-07-19

## 2018-06-19 NOTE — PROGRESS NOTES
Chief Complaint   Patient presents with   • Hospital Follow-up       HISTORY OF PRESENT ILLNESS: Patient is a 73 y.o. male established patient here today for the following concerns:    1. Palpitations  2. Paroxysmal atrial fibrillation (HCC)  3. ROBB (dyspnea on exertion)  4. Anxiety  Nahid is here today for follow-up from a hospital stay the other week for dyspnea on exertion and some palpitations.  He ruled out for acute coronary syndrome with a negative P Thal.  Reports that he did feel much improved when they gave him Xanax for the stress test.  He does find that things have worsened since he came back to altitude in [place].  When he is back home in [place] reports that his breathing is better.  He is a former smoker quit 25 years ago.  He denies any cough or wheeze.  Previous history of paroxysmal A. fib currently on aspirin alone.  Denies any weakness or numbness.  Often when he gets to exerting himself he feels more short of breath.  He blames being more overweight than he previously had and deconditioning.  In addition has had quite a bit of stress ongoing with moving, selling several properties and an ailing mother-in-law.  He finds that he is getting near panic attacks.  He is also severely claustrophobic and often uses alprazolam for flights.  He does need a refill on this as he is only 5 tablets left.    He brings with him his printout of his heart rate based on his smart watch.  This appears to be in a sinus rhythm    5. Otalgia of both ears  Reports that he has been experiencing bilateral itching of both ears.  No changes in hearing no pain or pressure.  Has been using hydrocortisone on the external aspects as he has a history of eczema in the ear canals.  No fevers or chills.      Past Medical, Social, and Family history reviewed and updated in EPIC    Allergies:Patient has no known allergies.    Current Outpatient Prescriptions   Medication Sig Dispense Refill   • busPIRone (BUSPAR) 10 MG Tab Take 1  "Tab by mouth 2 times a day for 30 days. 60 Tab 11   • ALPRAZolam (XANAX) 0.5 MG Tab Take 1 Tab by mouth 2 times a day as needed for Sleep for up to 30 days. 15 Tab 0   • aspirin EC (ECOTRIN) 81 MG Tablet Delayed Response Take 81 mg by mouth every 48 hours.     • multivitamin (THERAGRAN) Tab Take 1 Tab by mouth every day.     • lisinopril (PRINIVIL) 10 MG Tab Take 10 mg by mouth every day.       No current facility-administered medications for this visit.          ROS:  Review of Systems   Constitutional: Negative for fever, chills, weight loss and malaise/fatigue.   HENT: Negative for ear pain, nosebleeds, congestion, sore throat and neck pain.    Eyes: Negative for blurred vision.   Respiratory: Negative for cough, sputum production, shortness of breath and wheezing.    Cardiovascular: Negative for chest pain, palpitations,  and leg swelling.   Gastrointestinal: Negative for heartburn, nausea, vomiting, diarrhea and abdominal pain.   Genitourinary: Negative for dysuria, urgency and frequency.   Musculoskeletal: Negative for myalgias, back pain and joint pain.   Skin: Negative for rash and itching.   Neurological: Negative for dizziness, tingling, tremors, sensory change, focal weakness and headaches.   Endo/Heme/Allergies: Does not bruise/bleed easily.   Psychiatric/Behavioral: Negative for depression, anxiety, suicidal ideas, insomnia and memory loss.      Exam:  Blood pressure 126/80, pulse 82, temperature 36 °C (96.8 °F), resp. rate 16, height 1.835 m (6' 0.24\"), weight 122 kg (269 lb), SpO2 93 %.    General:  Well nourished, well developed in NAD  Head is grossly normal.  HEENT: Normocephalic and atraumatic. TMs clear bilaterally. Oropharynx is clear without erythema and no tonsillar exudate. Nasal mucosa pink with minimal rhinorrhea.  Neck: Supple without JVD   Pulmonary:  Normal effort.   Cardiovascular: Regular rate  Extremities: no clubbing, cyanosis, or edema.  Psych: affect appropriate      Please note " that this dictation was created using voice recognition software. I have made every reasonable attempt to correct obvious errors, but I expect that there are errors of grammar and possibly content that I did not discover before finalizing the note.    Assessment/Plan:  1. Palpitations  We will obtain Holter monitor, referral to cardiology to see if he needs further risk stratification on possible coronary artery disease.  - HOLTER MONITOR / EVENT RECORDER; Future  - REFERRAL TO CARDIOLOGY    2. Paroxysmal atrial fibrillation (HCC)  As mentioned above continue aspirin.  Heart rate is well controlled.  - HOLTER MONITOR / EVENT RECORDER; Future  - REFERRAL TO CARDIOLOGY    3. ROBB (dyspnea on exertion)  Unclear etiology, concerning for coronary artery disease.  May consider additional re stratification will consult with cardiology on this.  In addition given former smoker, may obtain some pulmonary function testing if cardiac testing is all normal.  We will also treat the anxiety.  He will work on weight reduction.  - HOLTER MONITOR / EVENT RECORDER; Future  - REFERRAL TO CARDIOLOGY    4. Anxiety  - ALPRAZolam (XANAX) 0.5 MG Tab; Take 1 Tab by mouth 2 times a day as needed for Sleep for up to 30 days.  Dispense: 15 Tab; Refill: 0  Start BuSpar twice daily, Xanax for travel and claustrophobia.   reviewed.  Discussed that this is a habit-forming medication.    5. Otalgia of both ears  Clear today, may continue use hydrocortisone as needed    Follow-up after cardiology consultation

## 2018-06-21 ENCOUNTER — TELEPHONE (OUTPATIENT)
Dept: MEDICAL GROUP | Facility: PHYSICIAN GROUP | Age: 73
End: 2018-06-21

## 2018-06-21 NOTE — TELEPHONE ENCOUNTER
VOICEMAIL  1. Caller Name: Amie/cardiology                      Call Back Number: [id number]    2. Message: states they received an order for a holter monitor and is asking what kind.  24 or 48 hr or other.    3. Patient approves office to leave a detailed voicemail/MyChart message: N\A

## 2018-06-28 ENCOUNTER — NON-PROVIDER VISIT (OUTPATIENT)
Dept: CARDIOLOGY | Facility: MEDICAL CENTER | Age: 73
End: 2018-06-28
Payer: MEDICARE

## 2018-06-28 DIAGNOSIS — I49.3 PVC (PREMATURE VENTRICULAR CONTRACTION): ICD-10-CM

## 2018-06-28 DIAGNOSIS — I49.1 PREMATURE ATRIAL CONTRACTION: ICD-10-CM

## 2018-06-28 DIAGNOSIS — R06.09 DOE (DYSPNEA ON EXERTION): ICD-10-CM

## 2018-06-28 DIAGNOSIS — I48.0 PAROXYSMAL ATRIAL FIBRILLATION (HCC): ICD-10-CM

## 2018-06-28 DIAGNOSIS — R00.2 PALPITATIONS: ICD-10-CM

## 2018-06-28 DIAGNOSIS — I47.10 SVT (SUPRAVENTRICULAR TACHYCARDIA): ICD-10-CM

## 2018-07-02 DIAGNOSIS — R06.09 DOE (DYSPNEA ON EXERTION): ICD-10-CM

## 2018-07-02 DIAGNOSIS — I48.0 PAROXYSMAL ATRIAL FIBRILLATION (HCC): ICD-10-CM

## 2018-07-02 DIAGNOSIS — R00.2 PALPITATIONS: ICD-10-CM

## 2018-07-02 LAB — EKG IMPRESSION: NORMAL

## 2018-07-02 PROCEDURE — 93224 XTRNL ECG REC UP TO 48 HRS: CPT | Performed by: INTERNAL MEDICINE

## 2018-07-03 ENCOUNTER — HOSPITAL ENCOUNTER (OUTPATIENT)
Dept: RADIOLOGY | Facility: MEDICAL CENTER | Age: 73
End: 2018-07-03
Attending: SURGERY
Payer: MEDICARE

## 2018-07-03 DIAGNOSIS — I65.23 BILATERAL CAROTID ARTERY STENOSIS: ICD-10-CM

## 2018-07-03 PROCEDURE — 93880 EXTRACRANIAL BILAT STUDY: CPT

## 2018-07-03 NOTE — PROGRESS NOTES
Patient Nahid Sosa was discharged from [facility] on 06/12/2018 after experiencing dyspnea on exertion. The patient was instructed to follow up with his Primary Care Physician on 6/19/2018. [facility] Patient Advocate made the initial outreach call to the patient on 06/14/2018 where the patient ultimately declined services due to patient traveling out of the area and also having enough support at home. The patient has 2 future Primary Care Physician appointments scheduled.

## 2018-07-11 ENCOUNTER — OFFICE VISIT (OUTPATIENT)
Dept: MEDICAL GROUP | Facility: PHYSICIAN GROUP | Age: 73
End: 2018-07-11
Payer: MEDICARE

## 2018-07-11 VITALS
HEART RATE: 70 BPM | DIASTOLIC BLOOD PRESSURE: 84 MMHG | RESPIRATION RATE: 14 BRPM | WEIGHT: 271 LBS | SYSTOLIC BLOOD PRESSURE: 118 MMHG | OXYGEN SATURATION: 93 % | TEMPERATURE: 97.4 F | BODY MASS INDEX: 36.7 KG/M2 | HEIGHT: 72 IN

## 2018-07-11 DIAGNOSIS — R42 DIZZINESS: ICD-10-CM

## 2018-07-11 DIAGNOSIS — R06.00 DYSPNEA, UNSPECIFIED TYPE: ICD-10-CM

## 2018-07-11 DIAGNOSIS — I48.0 PAROXYSMAL ATRIAL FIBRILLATION (HCC): ICD-10-CM

## 2018-07-11 PROCEDURE — 99214 OFFICE O/P EST MOD 30 MIN: CPT | Performed by: FAMILY MEDICINE

## 2018-07-11 NOTE — PROGRESS NOTES
"Chief Complaint   Patient presents with   • Follow-Up     holter monitor       HISTORY OF PRESENT ILLNESS: Patient is a 73 y.o. male established patient here today for the following concerns:    Nahid is here to discuss his holter monitor and carotid US as the part of his work up for ROBB and dizziness when coming back to the area from lower altitude.  He had stress testing which was normal.  Hx of PAF on ASA with good rate control.  Carotid US was done due to the dizziness which showed only mild plaquing.  Holter showed SR occasional PVCs and short runs of SVT mostly while asleep without correlation to any journal recordings.  He reports that he is feeling well overall with no significant symptoms now and largely feels it was stress and deconditioning.  He did take the buspar for 2 weeks and feels \"it broke the cycle\".  He has not needed it any further and continues to have the xanax to fly back and forth to [place] to finish their home there.      Past Medical, Social, and Family history reviewed and updated in EPIC    Allergies:Patient has no known allergies.    Current Outpatient Prescriptions   Medication Sig Dispense Refill   • ALPRAZolam (XANAX) 0.5 MG Tab Take 1 Tab by mouth 2 times a day as needed for Sleep for up to 30 days. 15 Tab 0   • aspirin EC (ECOTRIN) 81 MG Tablet Delayed Response Take 81 mg by mouth every 48 hours.     • multivitamin (THERAGRAN) Tab Take 1 Tab by mouth every day.     • lisinopril (PRINIVIL) 10 MG Tab Take 10 mg by mouth every day.     • busPIRone (BUSPAR) 10 MG Tab Take 1 Tab by mouth 2 times a day for 30 days. 60 Tab 11     No current facility-administered medications for this visit.          ROS:  Review of Systems   Constitutional: Negative for fever, chills, weight loss and malaise/fatigue.   HENT: Negative for ear pain, nosebleeds, congestion, sore throat and neck pain.    Eyes: Negative for blurred vision.   Respiratory: Negative for cough, sputum production, shortness of " "breath and wheezing.    Cardiovascular: Negative for chest pain, palpitations,  and leg swelling.   Gastrointestinal: Negative for heartburn, nausea, vomiting, diarrhea and abdominal pain.   Genitourinary: Negative for dysuria, urgency and frequency.   Musculoskeletal: Negative for myalgias, back pain and joint pain.   Skin: Negative for rash and itching.   Neurological: Negative for dizziness, tingling, tremors, sensory change, focal weakness and headaches.   Endo/Heme/Allergies: Does not bruise/bleed easily.   Psychiatric/Behavioral: Negative for depression, anxiety, suicidal ideas, insomnia and memory loss.      Exam:  Blood pressure 118/84, pulse 70, temperature 36.3 °C (97.4 °F), resp. rate 14, height 1.835 m (6' 0.24\"), weight 122.9 kg (271 lb), SpO2 93 %.    General:  Well nourished, well developed in NAD  Head is grossly normal.  Neck: Supple without JVD   Pulmonary:  Normal effort.   Cardiovascular: Regular rate  Extremities: no clubbing, cyanosis, or edema.  Psych: affect appropriate      Please note that this dictation was created using voice recognition software. I have made every reasonable attempt to correct obvious errors, but I expect that there are errors of grammar and possibly content that I did not discover before finalizing the note.    Assessment/Plan:  1. Dyspnea, unspecified type  No clear etiology for it at this time.  Given former smoker and difficulty with altitude adjustment, discussed that we could undergo PFT'ing, but at this time he feels strongly that this was stress and deconditioning.  He would like a trial of increasing exercise.      2. Dizziness  Resolved.     3. Paroxysmal atrial fibrillation (HCC)  No evidence on 48 hour holter monitor.  Continue ASA  Monitor pulse rate.     "

## 2018-07-12 ENCOUNTER — PATIENT MESSAGE (OUTPATIENT)
Dept: MEDICAL GROUP | Facility: PHYSICIAN GROUP | Age: 73
End: 2018-07-12

## 2018-09-17 ENCOUNTER — OFFICE VISIT (OUTPATIENT)
Dept: CARDIOLOGY | Facility: MEDICAL CENTER | Age: 73
End: 2018-09-17
Payer: MEDICARE

## 2018-09-17 VITALS
OXYGEN SATURATION: 93 % | SYSTOLIC BLOOD PRESSURE: 102 MMHG | HEART RATE: 70 BPM | RESPIRATION RATE: 14 BRPM | BODY MASS INDEX: 35.78 KG/M2 | DIASTOLIC BLOOD PRESSURE: 70 MMHG | WEIGHT: 270 LBS | HEIGHT: 73 IN

## 2018-09-17 DIAGNOSIS — R00.2 PALPITATIONS: ICD-10-CM

## 2018-09-17 DIAGNOSIS — I48.0 PAROXYSMAL ATRIAL FIBRILLATION (HCC): ICD-10-CM

## 2018-09-17 PROCEDURE — 99203 OFFICE O/P NEW LOW 30 MIN: CPT | Performed by: INTERNAL MEDICINE

## 2018-09-17 RX ORDER — ALPRAZOLAM 0.5 MG/1
0.5 TABLET ORAL PRN
COMMUNITY

## 2018-09-17 NOTE — PROGRESS NOTES
Chief Complaint   Patient presents with   • Palpitations       Subjective:   Nahid Sosa is a 73 y.o. male who presents today with a history of palpitations that occur intermittently and more often when he is in [place].  He spends time between [facility] in [place] having little in the way [place] but certainly more symptoms at this altitude.  He had an episode of atrial fibrillation to 3 years ago at which time he had diverticulitis followed by cholecystitis and subsequent cholecystectomy.  He was during the surgery that atrial fibrillation was noted.  He has had no further episodes.  The palpitations recently led to a Holter monitor in June which revealed PVCs and short episodes of irregular tachycardia the longest being 6 beats and not associated with symptoms.  He does not have orthopnea, PND, pedal edema.  There is no symptoms suggesting claudication or stroke.  Following his last episode of severe palpitations in May he felt fuzzy in the head for about 3 weeks.  His evaluation in the emergency room during that was unremarkable and there was no atrial fibrillation diagnosed.  He does take aspirin 81 mg daily at least.  He has no symptoms suggesting angina and no exercise related chest discomfort    Past Medical History:   Diagnosis Date   • Diverticulosis    • Hypertension    • Incisional hernia    • Prostate cancer (HCC)     s/p MRI guided laser ablation in [place]   • Renal cell cancer (HCC)     left, nephrectomy      Past Surgical History:   Procedure Laterality Date   • ERCP IN OR  1/3/2017    Procedure: ERCP IN OR;  Surgeon: Trevon Valle M.D.;  Location: SURGERY [facility];  Service:    • KATHARINA BY LAPAROSCOPY N/A 1/2/2017    Procedure: KATHARINA BY LAPAROSCOPY- Grams;  Surgeon: Twyla Jonas M.D.;  Location: SURGERY [facility];  Service:    • OTHER      MRI guided laser ablation of prostate     Family History   Problem Relation Age of Onset   • Heart Disease Father         possibly  "heart disease   • Other Mother          of Polio early age     Social History     Social History   • Marital status:      Spouse name: N/A   • Number of children: N/A   • Years of education: N/A     Occupational History   • Not on file.     Social History Main Topics   • Smoking status: Never Smoker   • Smokeless tobacco: Never Used   • Alcohol use No   • Drug use: No   • Sexual activity: Not on file     Other Topics Concern   • Not on file     Social History Narrative   • No narrative on file     No Known Allergies  Outpatient Encounter Prescriptions as of 2018   Medication Sig Dispense Refill   • ALPRAZolam (XANAX) 0.5 MG Tab Take 0.5 mg by mouth as needed.     • aspirin EC (ECOTRIN) 81 MG Tablet Delayed Response Take 81 mg by mouth every 48 hours.     • multivitamin (THERAGRAN) Tab Take 1 Tab by mouth every day.     • lisinopril (PRINIVIL) 10 MG Tab Take 10 mg by mouth every day.       No facility-administered encounter medications on file as of 2018.      ROS.  He  complete review was evaluated with the patient.  Positive response was limited to palpitations as noted in the HPI.  All other responses in the review of systems are negative     Objective:   /70   Pulse 70   Resp 14   Ht 1.854 m (6' 1\")   Wt 122.5 kg (270 lb)   SpO2 93%   BMI 35.62 kg/m²     Physical Exam   Exam:    Vitals:    18 1307   BP: 102/70   Pulse: 70   Resp: 14   SpO2: 93%   Weight: 122.5 kg (270 lb)   Height: 1.854 m (6' 1\")     Constitutional: Well developed, well nourished male in no acute distress. Appears approximate stated age and provides a good history.   HEENT: Normocephalic, pupils are equal and responsive. Hint of arcus. Conjunctiva and sclera are clear. No xanthelasma. No diagonal ear lobe crease noted. Mucus membranes are moist and pink. Good oral hygiene  Neck: No JVD or HJR. JVP = 4-5cm H2O. Good carotid upstroke with no bruit. No lymphadenopathy, No thyroid enlargement.  Cardiovascular: " Regular rhythm, no ectopics. No murmur, gallop, rub or click. No lift, heave,  thrill, or cardiomegaly  Lungs: Normal effort, Clear to auscultation and percussion. No rales, rhonchi, wheezing   Abdomen: Soft, non tender, obese. No liver, kidney, spleen or mass palpable. The abdominal aorta is not palpable. Active bowel sounds are noted and there is no bruit  Extremities:  No cyanosis, edema, clubbing. Palpable posterior tibial and dorsal pedal pulses bilaterally.   Skin:   Warm and dry, no active lesions  Neurologic: Alert & oriented. Strength and sensation grossly intact. No lateralizing signs  Psychiatric:  Affect, mood, and judgement are appropriate    Assessment:     1. Palpitations     2. Paroxysmal atrial fibrillation (HCC)         Medical Decision Making:  Today's Assessment / Status / Plan:   The patient has palpitations with several short runs of slightly irregular tachycardia.  There is a history of remote atrial fibrillation but nothing recently and no symptoms to suggest that.  He does take aspirin for stroke prevention which we discussed.  The because he had that he experienced for 3 weeks after symptoms in May of this year is troubling but does not sound like stroke or TIA symptoms.  He will continue aspirin as his chads-vas score is low.  Weight loss was encouraged and the patient will return when he comes back to [place] next spring.

## 2020-09-18 ENCOUNTER — OFFICE VISIT (OUTPATIENT)
Dept: MEDICAL GROUP | Facility: PHYSICIAN GROUP | Age: 75
End: 2020-09-18
Payer: MEDICARE

## 2020-09-18 VITALS
TEMPERATURE: 98.2 F | DIASTOLIC BLOOD PRESSURE: 82 MMHG | RESPIRATION RATE: 16 BRPM | OXYGEN SATURATION: 92 % | SYSTOLIC BLOOD PRESSURE: 130 MMHG | HEART RATE: 78 BPM | BODY MASS INDEX: 34.59 KG/M2 | WEIGHT: 261 LBS | HEIGHT: 73 IN

## 2020-09-18 DIAGNOSIS — L21.9 SEBORRHEIC DERMATITIS: ICD-10-CM

## 2020-09-18 DIAGNOSIS — G25.81 RLS (RESTLESS LEGS SYNDROME): ICD-10-CM

## 2020-09-18 DIAGNOSIS — M15.9 PRIMARY OSTEOARTHRITIS INVOLVING MULTIPLE JOINTS: ICD-10-CM

## 2020-09-18 PROBLEM — R06.09 DOE (DYSPNEA ON EXERTION): Status: RESOLVED | Noted: 2018-06-11 | Resolved: 2020-09-18

## 2020-09-18 PROBLEM — F40.240 CLAUSTROPHOBIA: Status: RESOLVED | Noted: 2017-08-23 | Resolved: 2020-09-18

## 2020-09-18 PROCEDURE — 99214 OFFICE O/P EST MOD 30 MIN: CPT | Performed by: FAMILY MEDICINE

## 2020-09-18 RX ORDER — PREGNENOLONE, MICRONIZED 100 %
POWDER (GRAM) MISCELLANEOUS
COMMUNITY
Start: 2020-09-16 | End: 2020-09-17

## 2020-09-18 RX ORDER — ALFUZOSIN HYDROCHLORIDE 10 MG/1
TABLET, EXTENDED RELEASE ORAL
COMMUNITY
Start: 2020-08-17 | End: 2020-09-17

## 2020-09-18 RX ORDER — LEVOCETIRIZINE DIHYDROCHLORIDE 5 MG/1
5 TABLET, FILM COATED ORAL
COMMUNITY
Start: 2020-09-16 | End: 2020-09-17

## 2020-09-18 RX ORDER — KETOCONAZOLE 20 MG/G
1 CREAM TOPICAL DAILY
Qty: 60 G | Refills: 1 | Status: SHIPPED | OUTPATIENT
Start: 2020-09-18

## 2020-09-18 RX ORDER — FLUTICASONE PROPIONATE 50 MCG
2 SPRAY, SUSPENSION (ML) NASAL
COMMUNITY
Start: 2020-09-16 | End: 2020-09-17

## 2020-09-18 RX ORDER — LOSARTAN POTASSIUM 25 MG/1
TABLET ORAL
COMMUNITY
Start: 2020-09-16 | End: 2020-09-17

## 2020-09-18 RX ORDER — SILDENAFIL 50 MG/1
TABLET, FILM COATED ORAL
COMMUNITY
Start: 2020-08-16

## 2020-09-18 RX ORDER — CIPROFLOXACIN 500 MG/1
500 TABLET, FILM COATED ORAL
COMMUNITY
Start: 2020-09-16 | End: 2020-09-17

## 2020-09-18 RX ORDER — CETIRIZINE HYDROCHLORIDE 10 MG/1
10 TABLET ORAL
COMMUNITY
Start: 2020-09-16 | End: 2020-09-17

## 2020-09-18 RX ORDER — ALFUZOSIN HYDROCHLORIDE 10 MG/1
TABLET, EXTENDED RELEASE ORAL
COMMUNITY

## 2020-09-18 RX ORDER — TAMSULOSIN HYDROCHLORIDE 0.4 MG/1
0.4 CAPSULE ORAL DAILY
COMMUNITY
Start: 2020-09-16 | End: 2020-09-17

## 2020-09-18 RX ORDER — OMEGA-3 FATTY ACIDS/FISH OIL 300-1000MG
1000 CAPSULE ORAL
COMMUNITY
Start: 2020-09-16 | End: 2020-09-17

## 2020-09-18 RX ORDER — PRAMIPEXOLE DIHYDROCHLORIDE 0.25 MG/1
.25-.75 TABLET ORAL NIGHTLY PRN
Qty: 90 TAB | Refills: 11 | Status: SHIPPED | OUTPATIENT
Start: 2020-09-18

## 2020-09-18 RX ORDER — LOSARTAN POTASSIUM 25 MG/1
TABLET ORAL
COMMUNITY
Start: 2020-09-16

## 2020-09-18 ASSESSMENT — PATIENT HEALTH QUESTIONNAIRE - PHQ9: CLINICAL INTERPRETATION OF PHQ2 SCORE: 0

## 2020-09-18 NOTE — PROGRESS NOTES
Chief Complaint   Patient presents with   • Other     restless leg   • Rash     lft ear       HISTORY OF PRESENT ILLNESS: Patient is a 75 y.o. male established patient, who splits his time between here in [place] and primarily has been receiving his care through [place] over the last year who is here today for the following concerns:    1. RLS (restless legs syndrome)  This is a pleasant 75-year-old gentleman with history of hypertension, solitary kidney secondary to renal cell carcinoma who presents today with concerns over restless leg symptoms particularly in the evening and at night which has caused disruption of his sleep and his wife sleep.  He is here today to discuss if there is any medications he can use to try to help control this.    2. Primary osteoarthritis involving multiple joints  In addition he continues to work with his orthopedic specialist in [place] on stem cell and Orthovisc shots to help control his joint pain including right knee and ankle as well as bilateral shoulder pain.  He does report that use of Aleve twice daily for 3 or 4 days and occasional tramadol seems to help provide some relief for a week or more.  He has been cautioned because he has history of nephrectomy and solitary kidney.  He reports most recent lab test showed no problems with his kidneys.  In care everywhere we are able to identify some previous labs done in January which did show normal renal function.    3. Solitary kidney  As mentioned above.  Continues on losartan for kidney protection and blood pressure control.    4. Seborrheic dermatitis  In addition he reports rash that keeps presenting on the posterior aspect of the ears casually in the ear canal itself and into the scalp.  Previously had some cream that seemed to work.  He has been using anything over-the-counter at this time.  Occasionally is pruritic and scaling.      Past Medical, Social, and Family history reviewed and updated in EPIC    Allergies:Patient  "has no known allergies.    Current Outpatient Medications   Medication Sig Dispense Refill   • alfuzosin (UROXATRAL) 10 MG SR tablet alfuzosin ER 10 mg tablet,extended release 24 hr     • losartan (COZAAR) 25 MG Tab      • sildenafil citrate (VIAGRA) 50 MG tablet TAKE ONE-HALF TABLET BY MOUTH AS NEEDED FOR SEX     • pramipexole (MIRAPEX) 0.25 MG Tab Take 1-3 Tabs by mouth at bedtime as needed. 90 Tab 11   • ketoconazole (NIZORAL) 2 % Cream Apply 1 Application to affected area(s) every day. 60 g 1   • ALPRAZolam (XANAX) 0.5 MG Tab Take 0.5 mg by mouth as needed.     • multivitamin (THERAGRAN) Tab Take 1 Tab by mouth every day.     • aspirin EC (ECOTRIN) 81 MG Tablet Delayed Response Take 81 mg by mouth every 48 hours.     • lisinopril (PRINIVIL) 10 MG Tab Take 10 mg by mouth every day.       No current facility-administered medications for this visit.          ROS:  Review of Systems   Constitutional: Negative for fever, chills, weight loss and malaise/fatigue.   HENT: Negative for ear pain, nosebleeds, congestion, sore throat and neck pain.    Eyes: Negative for blurred vision.   Respiratory: Negative for cough, sputum production, shortness of breath and wheezing.    Cardiovascular: Negative for chest pain, palpitations,  and leg swelling.   Gastrointestinal: Negative for heartburn, nausea, vomiting, diarrhea and abdominal pain.   Genitourinary: Negative for dysuria, urgency and frequency.   Musculoskeletal: Negative for myalgias, back pain and positive joint pain.   Skin: Negative for rash and itching.   Neurological: Negative for dizziness, tingling, tremors, sensory change, focal weakness and headaches.   Endo/Heme/Allergies: Does not bruise/bleed easily.   Psychiatric/Behavioral: Negative for depression, anxiety, suicidal ideas, insomnia and memory loss.      Exam:  /82   Pulse 78   Temp 36.8 °C (98.2 °F)   Resp 16   Ht 1.854 m (6' 1\")   Wt 118.4 kg (261 lb)   SpO2 92%     General:  Well nourished, " well developed in NAD  Head is grossly normal.  Seborrhea noted posterior to the external ear  Neck: Supple without JVD   Pulmonary:  Normal effort.   Cardiovascular: Regular rate  Extremities: no clubbing, cyanosis, or edema.  Psych: affect appropriate      Please note that this dictation was created using voice recognition software. I have made every reasonable attempt to correct obvious errors, but I expect that there are errors of grammar and possibly content that I did not discover before finalizing the note.    Assessment/Plan:  1. RLS (restless legs syndrome)  Trial of  - pramipexole (MIRAPEX) 0.25 MG Tab; Take 1-3 Tabs by mouth at bedtime as needed.  Dispense: 90 Tab; Refill: 11    2. Primary osteoarthritis involving multiple joints  Continue follow-up with orthopedics.  Sparing use of NSAIDs    3. Solitary kidney  Discussed not overdoing with nonsteroidal anti-inflammatory drugs, monitoring renal function every 3 to 6 months    4. Seborrheic dermatitis  Trial of ketoconazole cream  - ketoconazole (NIZORAL) 2 % Cream; Apply 1 Application to affected area(s) every day.  Dispense: 60 g; Refill: 1    Follow-up as needed in 3 to 6 months

## 2021-01-15 DIAGNOSIS — Z23 NEED FOR VACCINATION: ICD-10-CM
